# Patient Record
Sex: MALE | Race: WHITE | NOT HISPANIC OR LATINO | Employment: OTHER | URBAN - METROPOLITAN AREA
[De-identification: names, ages, dates, MRNs, and addresses within clinical notes are randomized per-mention and may not be internally consistent; named-entity substitution may affect disease eponyms.]

---

## 2018-03-28 ENCOUNTER — HOSPITAL ENCOUNTER (EMERGENCY)
Facility: OTHER | Age: 33
Discharge: HOME | End: 2018-03-28
Attending: EMERGENCY MEDICINE
Payer: MEDICAID

## 2018-03-28 VITALS
HEART RATE: 52 BPM | RESPIRATION RATE: 17 BRPM | OXYGEN SATURATION: 98 % | SYSTOLIC BLOOD PRESSURE: 138 MMHG | HEIGHT: 68 IN | BODY MASS INDEX: 25.76 KG/M2 | TEMPERATURE: 99 F | WEIGHT: 170 LBS | DIASTOLIC BLOOD PRESSURE: 87 MMHG

## 2018-03-28 DIAGNOSIS — R07.9 CHEST PAIN: ICD-10-CM

## 2018-03-28 DIAGNOSIS — R07.89 ATYPICAL CHEST PAIN: Primary | ICD-10-CM

## 2018-03-28 LAB — TROPONIN I SERPL DL<=0.01 NG/ML-MCNC: <0.006 NG/ML

## 2018-03-28 PROCEDURE — 84484 ASSAY OF TROPONIN QUANT: CPT

## 2018-03-28 PROCEDURE — 99284 EMERGENCY DEPT VISIT MOD MDM: CPT | Mod: 25

## 2018-03-28 PROCEDURE — 93005 ELECTROCARDIOGRAM TRACING: CPT

## 2018-03-28 PROCEDURE — 93010 ELECTROCARDIOGRAM REPORT: CPT | Mod: ,,, | Performed by: INTERNAL MEDICINE

## 2018-03-28 RX ORDER — KETOROLAC TROMETHAMINE 10 MG/1
10 TABLET, FILM COATED ORAL
Status: DISCONTINUED | OUTPATIENT
Start: 2018-03-28 | End: 2018-03-28

## 2018-03-29 NOTE — ED NOTES
NEURO: Pt AAO x 4. Behavior and speech appropriate to situation.   CARDIAC: Regular rhythm auscultated. + chest pain  RESPIRATORY: Respirations even and unlabored. RR 12   MUSCULOSKELETAL: Active ROM noted to extremities

## 2018-03-29 NOTE — ED PROVIDER NOTES
"Encounter Date: 3/28/2018    SCRIBE #1 NOTE: I, Shira Funez, am scribing for, and in the presence of, Dr. Calderon.       History     Chief Complaint   Patient presents with    Chest Pain     pt c/o intermittent left sided chest discomfort x "a few days", Hx of mitral valve prolapse, Hx IV drug use, reports last used heroin on 3/17     Time seen by provider: 8:18 PM    This is a 32 y.o. male who presents with complaint of left sided chest pain for three days. The pain is constant and radiates to the left arm. It is not affected by exertion or inspiration. He has experienced similar pain in the past. He stays at [facility] where he has been playing volleyball without any reproduction of the symptoms. He was receiving a routine check by the physician who called EMS when he mentioned this complaint. He reports high blood pressure and says that he was on Atenolol a few years ago. He is on Prozac and stopped Vivitrol one or two days ago. He denies fever, chills, SOB, leg swelling, palpitations, abdominal pain, back pain, or headache. He reports Hx of mitral valve prolapse and sciatica. He denies family history of early cardiac disease. He reports history of IV drug use (meth and heroin), last used cocaine in 2017. He has no additional compalints.     Additional past medical, surgical, and social history as outlined in the nursing assessment was reviewed by me.        The history is provided by the patient and medical records.     Review of patient's allergies indicates:  No Known Allergies  No past medical history on file.  No past surgical history on file.  No family history on file.  Social History   Substance Use Topics    Smoking status: Not on file    Smokeless tobacco: Not on file    Alcohol use Not on file     Review of Systems   Constitutional: Negative for chills and fever.   HENT: Negative for congestion, rhinorrhea and sore throat.    Respiratory: Negative for cough and shortness of breath.  "   Cardiovascular: Positive for chest pain. Negative for palpitations and leg swelling.   Gastrointestinal: Negative for abdominal pain, diarrhea, nausea and vomiting.   Endocrine: Negative for polyuria.   Genitourinary: Negative for decreased urine volume and dysuria.   Musculoskeletal: Negative for back pain.        Positive for arm pain.   Skin: Negative for rash.   Allergic/Immunologic: Negative for immunocompromised state.   Neurological: Negative for dizziness, weakness and headaches.   Hematological: Does not bruise/bleed easily.   Psychiatric/Behavioral: Negative for confusion.       Physical Exam     Initial Vitals [03/28/18 1924]   BP Pulse Resp Temp SpO2   (!) 161/99 60 18 97.9 °F (36.6 °C) 100 %      MAP       119.67         Physical Exam    Nursing note and vitals reviewed.  Constitutional: He appears well-developed and well-nourished. He is not diaphoretic. No distress.   HENT:   Head: Normocephalic and atraumatic.   Right Ear: External ear normal.   Left Ear: External ear normal.   Eyes: Conjunctivae and EOM are normal. Right eye exhibits no discharge. Left eye exhibits no discharge.   Neck: Normal range of motion. Neck supple. No tracheal deviation present.   Cardiovascular: Normal rate, regular rhythm, normal heart sounds and intact distal pulses. Exam reveals no gallop and no friction rub.    No murmur heard.  Pulmonary/Chest: Breath sounds normal. No stridor. No respiratory distress. He has no wheezes. He has no rhonchi. He has no rales. He exhibits no tenderness.   Abdominal: Soft. He exhibits no distension. There is no tenderness. There is no rebound and no guarding.   Musculoskeletal: Normal range of motion. He exhibits no edema or tenderness.   Neurological: He is alert and oriented to person, place, and time. He has normal strength. No cranial nerve deficit.   Skin: Skin is warm and dry. Capillary refill takes less than 2 seconds. No erythema. No pallor.   Psychiatric: His behavior is normal.  Thought content normal.         ED Course   Procedures  Labs Reviewed   TROPONIN I      Imaging Results          X-Ray Chest PA And Lateral (Final result)  Result time 03/28/18 21:00:25    Final result by Colby Salazar MD (03/28/18 21:00:25)                 Impression:      Normal radiographic appearance of the chest.      Electronically signed by: Colby Salazar MD  Date:    03/28/2018  Time:    21:00             Narrative:    EXAMINATION:  XR CHEST PA AND LATERAL    CLINICAL HISTORY:  Chest pain, unspecified    TECHNIQUE:  PA and lateral views of the chest were performed.    COMPARISON:  None    FINDINGS:  Monitoring leads overlie the chest.  Trachea is midline.  Cardiomediastinal silhouette is within normal limits.  The lungs are symmetrically well expanded and clear.  No pleural effusion or pneumothorax.  Included osseous structures appear intact.                              EKG Readings: (Independently Interpreted)   Initial Reading: No STEMI.   Sinus bradycardia at a rate of 51 bpm. MEGGAN.       X-Rays:   Independently Interpreted Readings:   Chest X-Ray: No consolidation, effusion, or pneumothorax.     Medical Decision Making:   Initial Assessment:   Patient presents with atypical chest pain. It is not exertional and has been present for over three days. His EKG shows no ischemia. I will obtain a chest X-Ray and send a single troponin. I do not feel that serial troponins are indicated if the first is normal. I will reassess.  Clinical Tests:   Lab Tests: Reviewed and Ordered  Radiological Study: Ordered and Reviewed  Medical Tests: Reviewed and Ordered  ED Management:  Diagnostic workup unremarkable. I discussed with patient the diagnostic results, diagnosis, treatment plan, and need for follow-up. Patient expressed understanding of my instructions. I was comfortable with his discharge home.              Scribe Attestation:   Scribe #1: I performed the above scribed service and the documentation accurately  describes the services I performed. I attest to the accuracy of the note.    Attending Attestation:           Physician Attestation for Scribe:  Physician Attestation Statement for Scribe #1: I, Dr. Calderon, reviewed documentation, as scribed by Shira Funez in my presence, and it is both accurate and complete.                    Clinical Impression:     1. Atypical chest pain    2. Chest pain                               Jyothi Calderon MD  03/29/18 1953

## 2018-03-29 NOTE — ED TRIAGE NOTES
"Pt arrived via EMS from [facility] with c/o left sided chest pain, onset 3 days ago. Pain radiates to left arm. Pt non reproducible, rates pain 3/10, describes as constant throbbing pain. Pt reports was sitting in a class when chest pain started. Pt denies NV or diaphoresis with symptom onset. Pt reports some SOB at symptom onset, pt reports he gets SOB more than he used to due to smoking 1 pack per day. Pt reports currently feeling " a little SOB currently, unsure if from being anxious about being in hospital. Pt has hx of IV drug use. Pt reports last meth and heroine use 3/17/18. Pt in NAD. Will continue to monitor.   "

## 2022-10-21 ENCOUNTER — TELEPHONE (OUTPATIENT)
Dept: OPTOMETRY | Facility: CLINIC | Age: 37
End: 2022-10-21
Payer: MEDICAID

## 2022-10-21 NOTE — TELEPHONE ENCOUNTER
Called and lvm for pt to call in or message through portal as we may need to see him today and only 1 dr in clinic..need to speak with him to assess his condition.    ----- Message from Ella Díaz RN sent at 10/20/2022  5:13 PM CDT -----  Regarding: [facility] ER visit f/u 10/20/2022 Dx: Corneal abrasion left eye  Please call patient to schedule a follow up appointment for a corneal abrasion of his left eye.     Thanks,    Ella Díaz RN, BSN  ED Navigator/Case Management  [phone]

## 2022-11-30 PROBLEM — N45.1 EPIDIDYMITIS: Status: ACTIVE | Noted: 2022-11-30
